# Patient Record
Sex: FEMALE | Race: WHITE | NOT HISPANIC OR LATINO | URBAN - NONMETROPOLITAN AREA
[De-identification: names, ages, dates, MRNs, and addresses within clinical notes are randomized per-mention and may not be internally consistent; named-entity substitution may affect disease eponyms.]

---

## 2018-02-06 ENCOUNTER — OFFICE VISIT (OUTPATIENT)
Dept: FAMILY MEDICINE CLINIC | Facility: CLINIC | Age: 53
End: 2018-02-06

## 2018-02-06 ENCOUNTER — LAB (OUTPATIENT)
Dept: LAB | Facility: OTHER | Age: 53
End: 2018-02-06

## 2018-02-06 VITALS
HEIGHT: 64 IN | SYSTOLIC BLOOD PRESSURE: 122 MMHG | DIASTOLIC BLOOD PRESSURE: 64 MMHG | HEART RATE: 84 BPM | BODY MASS INDEX: 24.62 KG/M2 | TEMPERATURE: 98.4 F | WEIGHT: 144.2 LBS

## 2018-02-06 DIAGNOSIS — B35.1 NAIL FUNGUS: ICD-10-CM

## 2018-02-06 DIAGNOSIS — I73.00 RAYNAUD'S DISEASE WITHOUT GANGRENE: Chronic | ICD-10-CM

## 2018-02-06 DIAGNOSIS — B35.1 NAIL FUNGUS: Primary | ICD-10-CM

## 2018-02-06 LAB
ALBUMIN SERPL-MCNC: 4.3 G/DL (ref 3.2–5.5)
ALBUMIN/GLOB SERPL: 1.7 G/DL (ref 1–3)
ALP SERPL-CCNC: 52 U/L (ref 15–121)
ALT SERPL W P-5'-P-CCNC: 15 U/L (ref 10–60)
ANION GAP SERPL CALCULATED.3IONS-SCNC: 8 MMOL/L (ref 5–15)
AST SERPL-CCNC: 19 U/L (ref 10–60)
BILIRUB SERPL-MCNC: 0.7 MG/DL (ref 0.2–1)
BUN BLD-MCNC: 10 MG/DL (ref 8–25)
BUN/CREAT SERPL: 16.7 (ref 7–25)
CALCIUM SPEC-SCNC: 9.9 MG/DL (ref 8.4–10.8)
CHLORIDE SERPL-SCNC: 101 MMOL/L (ref 100–112)
CO2 SERPL-SCNC: 29 MMOL/L (ref 20–32)
CREAT BLD-MCNC: 0.6 MG/DL (ref 0.4–1.3)
GFR SERPL CREATININE-BSD FRML MDRD: 104 ML/MIN/1.73 (ref 51–120)
GLOBULIN UR ELPH-MCNC: 2.6 GM/DL (ref 2.5–4.6)
GLUCOSE BLD-MCNC: 99 MG/DL (ref 70–100)
KOH PREP NAIL: NORMAL
POTASSIUM BLD-SCNC: 4.2 MMOL/L (ref 3.4–5.4)
PROT SERPL-MCNC: 6.9 G/DL (ref 6.7–8.2)
SODIUM BLD-SCNC: 138 MMOL/L (ref 134–146)

## 2018-02-06 PROCEDURE — 99213 OFFICE O/P EST LOW 20 MIN: CPT | Performed by: NURSE PRACTITIONER

## 2018-02-06 PROCEDURE — 80053 COMPREHEN METABOLIC PANEL: CPT | Performed by: NURSE PRACTITIONER

## 2018-02-06 PROCEDURE — 36415 COLL VENOUS BLD VENIPUNCTURE: CPT | Performed by: NURSE PRACTITIONER

## 2018-02-06 PROCEDURE — 87102 FUNGUS ISOLATION CULTURE: CPT | Performed by: NURSE PRACTITIONER

## 2018-02-06 PROCEDURE — 87220 TISSUE EXAM FOR FUNGI: CPT | Performed by: NURSE PRACTITIONER

## 2018-02-06 RX ORDER — TERBINAFINE HYDROCHLORIDE 250 MG/1
250 TABLET ORAL DAILY
Qty: 30 TABLET | Refills: 0 | Status: SHIPPED | OUTPATIENT
Start: 2018-02-06 | End: 2018-03-09

## 2018-02-06 NOTE — PROGRESS NOTES
Subjective   Anum Lawson is a 52 y.o. female. Patient here today with complaints of Upper Extremity Issue (finger)  Patient here today with complaints of left middle finger nail being discolored, thickened ×1 month, worsening.  Reports history of a knot's disease, was seeing Dr. Nelson and Dr. Flowers but no longer is seeing either one of them.  Wants to establish care with Dr. Reyes.  Reports numbness, pain to area but no drainage.  Denies fever, chills, nausea vomiting.    Vitals:    02/06/18 1319   BP: 122/64   Pulse: 84   Temp: 98.4 °F (36.9 °C)     No past medical history on file.  Upper Extremity Issue   This is a new problem. The current episode started 1 to 4 weeks ago. The problem occurs constantly. The problem has been gradually worsening. Associated symptoms include arthralgias, joint swelling (L hand, middle finger) and numbness (numbness reported to tip of L middle finger). Pertinent negatives include no abdominal pain, anorexia, chest pain, chills, congestion, coughing, diaphoresis, fatigue, fever, headaches, myalgias, nausea, neck pain, rash, sore throat, swollen glands, urinary symptoms, vertigo, visual change, vomiting or weakness. Nothing aggravates the symptoms. She has tried nothing for the symptoms. The treatment provided no relief.        The following portions of the patient's history were reviewed and updated as appropriate: allergies, current medications, past family history, past medical history, past social history, past surgical history and problem list.    Review of Systems   Constitutional: Negative.  Negative for chills, diaphoresis, fatigue and fever.   HENT: Negative.  Negative for congestion and sore throat.    Eyes: Negative.    Respiratory: Negative.  Negative for cough.    Cardiovascular: Negative.  Negative for chest pain.   Gastrointestinal: Negative.  Negative for abdominal pain, anorexia, nausea and vomiting.   Endocrine: Negative.    Genitourinary: Negative.     Musculoskeletal: Positive for arthralgias and joint swelling (L hand, middle finger). Negative for myalgias and neck pain.   Skin: Negative.  Negative for rash.   Allergic/Immunologic: Negative.    Neurological: Positive for numbness (numbness reported to tip of L middle finger). Negative for vertigo, weakness and headaches.   Hematological: Negative.    Psychiatric/Behavioral: Negative.        Objective   Physical Exam   Constitutional: She is oriented to person, place, and time. She appears well-developed and well-nourished. No distress.   Cardiovascular: Normal rate, regular rhythm and normal heart sounds.  Exam reveals no gallop and no friction rub.    No murmur heard.  Pulmonary/Chest: Effort normal and breath sounds normal. No respiratory distress. She has no wheezes. She has no rales.   Musculoskeletal: She exhibits tenderness.        Left hand: She exhibits decreased range of motion, tenderness, bony tenderness and swelling. She exhibits normal two-point discrimination, normal capillary refill, no deformity and no laceration. Normal sensation noted. Normal strength noted.        Hands:  Radial pulse palp    Neurological: She is alert and oriented to person, place, and time.   Skin: Skin is warm and dry. She is not diaphoretic.   Nursing note and vitals reviewed.      Assessment/Plan   Anum was seen today for upper extremity issue.    Diagnoses and all orders for this visit:    Nail fungus  -     Comprehensive Metabolic Panel; Future  -     Fungus Culture - Nail, Hand    Raynaud's disease without gangrene  -     Comprehensive Metabolic Panel; Future    Other orders  -     terbinafine (LAMISIL) 250 MG tablet; Take 1 tablet by mouth Daily.    will obtain CMP and can then start lamisil as above if LFTs WNL  She is aware not to start until she receives call from office informing her that labs are WNL  Will follow up with PCP or here in 1-2 weeks for recheck  Pt aware and in agreement to this plan  All  questions and concerns are addressed with understanding noted.   Continue with tylenol/motrin prn pain, swelling  Will clip nail and send to lab for testing as well

## 2018-02-12 ENCOUNTER — TELEPHONE (OUTPATIENT)
Dept: FAMILY MEDICINE CLINIC | Facility: CLINIC | Age: 53
End: 2018-02-12

## 2018-02-12 NOTE — TELEPHONE ENCOUNTER
Called and spoke with patients  and told him the results, and he stated her hands were still hurting and he wanted to make her an appointment with Shadia Carlos, so I connected him with the .

## 2018-02-13 ENCOUNTER — OFFICE VISIT (OUTPATIENT)
Dept: FAMILY MEDICINE CLINIC | Facility: CLINIC | Age: 53
End: 2018-02-13

## 2018-02-13 VITALS
DIASTOLIC BLOOD PRESSURE: 62 MMHG | HEART RATE: 84 BPM | HEIGHT: 64 IN | BODY MASS INDEX: 24.59 KG/M2 | WEIGHT: 144 LBS | SYSTOLIC BLOOD PRESSURE: 122 MMHG

## 2018-02-13 DIAGNOSIS — I73.00 RAYNAUD'S DISEASE WITHOUT GANGRENE: Chronic | ICD-10-CM

## 2018-02-13 DIAGNOSIS — L03.012 CELLULITIS OF FINGER OF LEFT HAND: Primary | ICD-10-CM

## 2018-02-13 PROCEDURE — 99213 OFFICE O/P EST LOW 20 MIN: CPT | Performed by: NURSE PRACTITIONER

## 2018-02-13 RX ORDER — GABAPENTIN 300 MG/1
300 CAPSULE ORAL
Qty: 30 CAPSULE | Refills: 0 | Status: SHIPPED | OUTPATIENT
Start: 2018-02-13 | End: 2018-03-09 | Stop reason: SDUPTHER

## 2018-02-13 RX ORDER — CEPHALEXIN 500 MG/1
500 CAPSULE ORAL 3 TIMES DAILY
Qty: 30 CAPSULE | Refills: 0 | Status: SHIPPED | OUTPATIENT
Start: 2018-02-13 | End: 2018-02-23

## 2018-02-13 NOTE — PROGRESS NOTES
Subjective   Anum Lawson is a 52 y.o. female. Patient here today with complaints of Nail Problem  pt here today with  complaining of L middle finger soreness, redness, she has hx of raynaud's dz. States she has used vaseline, neosporin which have helped pain somewhat. She did have nail sent to lab and was started on lamisil at last visit for fungus however lab was negative. She reports numbness , tingling pain to same area. She tried her 's gabapentin for the throbbing pain which is keeping her up at night and this helped. States she has been crying with pain however at present pain is mild.     Vitals:    02/13/18 1050   BP: 122/62   Pulse: 84     Past Medical History:   Diagnosis Date   • History of mammogram 12/05/2014   • Joint pain    • Painful swelling of joint    • Raynaud's disease      Wound Infection   This is a new problem. The current episode started 1 to 4 weeks ago. The problem occurs constantly. The problem has been unchanged. Associated symptoms include joint swelling and numbness. Pertinent negatives include no abdominal pain, anorexia, arthralgias, change in bowel habit, chest pain, chills, congestion, coughing, diaphoresis, fatigue, fever, headaches, myalgias, nausea, neck pain, rash, sore throat, swollen glands, urinary symptoms, vertigo, visual change, vomiting or weakness. The symptoms are aggravated by bending. Treatments tried: gabapentin. The treatment provided mild relief.        The following portions of the patient's history were reviewed and updated as appropriate: allergies, current medications, past family history, past medical history, past social history, past surgical history and problem list.    Review of Systems   Constitutional: Negative.  Negative for chills, diaphoresis, fatigue and fever.   HENT: Negative.  Negative for congestion and sore throat.    Eyes: Negative.    Respiratory: Negative.  Negative for cough.    Cardiovascular: Negative.  Negative for  chest pain.   Gastrointestinal: Negative.  Negative for abdominal pain, anorexia, change in bowel habit, nausea and vomiting.   Endocrine: Negative.    Genitourinary: Negative.    Musculoskeletal: Positive for joint swelling. Negative for arthralgias, myalgias and neck pain.   Skin: Positive for wound. Negative for rash.   Allergic/Immunologic: Negative.    Neurological: Positive for numbness. Negative for vertigo, weakness and headaches.   Hematological: Negative.    Psychiatric/Behavioral: Negative.        Objective   Physical Exam   Constitutional: She is oriented to person, place, and time. She appears well-developed and well-nourished. No distress.   HENT:   Head: Normocephalic and atraumatic.   Cardiovascular: Normal rate, regular rhythm and normal heart sounds.  Exam reveals no gallop and no friction rub.    No murmur heard.  Pulmonary/Chest: Effort normal and breath sounds normal. No respiratory distress. She has no wheezes. She has no rales.   Neurological: She is alert and oriented to person, place, and time.   Skin: Skin is warm and dry. No abrasion, no bruising, no burn, no ecchymosis, no laceration, no purpura and no rash noted. Rash is not macular, not papular, not maculopapular, not nodular, not pustular, not vesicular and not urticarial. She is not diaphoretic. There is erythema. No pallor.        The distal L hand, middle finger is erythematous, tender to palp, sl cool to touch, radial pulse palp, she has full ROM but complains of pain with this, nail is  from nail bed distally as well   Psychiatric: She has a normal mood and affect. Her behavior is normal.   Nursing note and vitals reviewed.      Assessment/Plan   Anum was seen today for nail problem.    Diagnoses and all orders for this visit:    Cellulitis of finger of left hand    Raynaud's disease without gangrene    Other orders  -     cephalexin (KEFLEX) 500 MG capsule; Take 1 capsule by mouth 3 (Three) Times a Day for 10  days.  -     gabapentin (NEURONTIN) 300 MG capsule; Take 1 capsule by mouth every night at bedtime for 30 days.      Labs are reviewed at today's visit.   shelby is obtained and reviewed  SHELBY query complete. Treatment plan to include limited course of prescribed controlled substance. Risks including addiction, benefits, and alternatives presented to patient.   Is given keflex and gabapentin for pain as above  She will RTC in 2 weeks, sooner if nec  She has appointment to establish with PCP next month  They are aware and are in agreement to this plan.  All questions and concerns are addressed with understanding noted.   Will stop lamisil

## 2018-02-15 LAB
FUNGUS WND CULT: ABNORMAL
FUNGUS WND CULT: ABNORMAL

## 2018-02-15 RX ORDER — FLUCONAZOLE 200 MG/1
200 TABLET ORAL DAILY
Qty: 24 TABLET | Refills: 0 | Status: SHIPPED | OUTPATIENT
Start: 2018-02-15 | End: 2018-03-09

## 2018-02-16 ENCOUNTER — TELEPHONE (OUTPATIENT)
Dept: FAMILY MEDICINE CLINIC | Facility: CLINIC | Age: 53
End: 2018-02-16

## 2018-02-16 NOTE — TELEPHONE ENCOUNTER
Informed pt of results and that we sent her medication into [facility], and If she has ay questions she can give our office a call.

## 2018-02-16 NOTE — TELEPHONE ENCOUNTER
----- Message from ANDREW Aguiar sent at 2/15/2018  8:40 AM CST -----  Inform pt, give her diflucan 200mg every week x 6 months

## 2018-02-27 ENCOUNTER — OFFICE VISIT (OUTPATIENT)
Dept: FAMILY MEDICINE CLINIC | Facility: CLINIC | Age: 53
End: 2018-02-27

## 2018-02-27 VITALS
HEIGHT: 64 IN | HEART RATE: 87 BPM | TEMPERATURE: 99 F | WEIGHT: 143 LBS | DIASTOLIC BLOOD PRESSURE: 68 MMHG | SYSTOLIC BLOOD PRESSURE: 120 MMHG | BODY MASS INDEX: 24.41 KG/M2

## 2018-02-27 DIAGNOSIS — L03.012 INFECTED NAILBED OF FINGER, LEFT: Primary | ICD-10-CM

## 2018-02-27 DIAGNOSIS — B37.9 CANDIDA ALBICANS INFECTION: ICD-10-CM

## 2018-02-27 DIAGNOSIS — I73.00 RAYNAUD'S DISEASE WITHOUT GANGRENE: ICD-10-CM

## 2018-02-27 PROCEDURE — 99213 OFFICE O/P EST LOW 20 MIN: CPT | Performed by: NURSE PRACTITIONER

## 2018-02-27 NOTE — PROGRESS NOTES
Subjective   Anum Lawson is a 52 y.o. female. Patient here today with complaints of Follow-up  pt here today with  for recheck of nail / finger infection. Still taking antibiotic since she has missed some doses, cont on diflucan weekly . Lab/nail cx indicated yeast infection. Cont on neurontin which has helped her symptoms of numbness, L hand, middle finger. Reports pain, swelling, erythema of L hand,middle finger much improved from last visit. She is establishing care with Dr. Reyes next week    Vitals:    02/27/18 1104   BP: 120/68   Pulse: 87   Temp: 99 °F (37.2 °C)     Past Medical History:   Diagnosis Date   • History of mammogram 12/05/2014   • Joint pain    • Painful swelling of joint    • Raynaud's disease      Wound Infection   This is a new problem. The current episode started 1 to 4 weeks ago. The problem occurs constantly. The problem has been rapidly improving. Pertinent negatives include no rash. Nothing aggravates the symptoms. Treatments tried: dilfucan, antibiotics. The treatment provided moderate relief.        The following portions of the patient's history were reviewed and updated as appropriate: allergies, current medications, past family history, past medical history, past social history, past surgical history and problem list.    Review of Systems   Constitutional: Negative.    HENT: Negative.    Eyes: Negative.    Respiratory: Negative.    Cardiovascular: Negative.    Gastrointestinal: Negative.    Endocrine: Negative.    Genitourinary: Negative.    Musculoskeletal: Negative.    Skin: Positive for wound (improved). Negative for rash.   Allergic/Immunologic: Negative.    Neurological: Negative.    Hematological: Negative.    Psychiatric/Behavioral: Negative.        Objective   Physical Exam   Constitutional: She is oriented to person, place, and time. She appears well-developed and well-nourished. No distress.   HENT:   Head: Normocephalic and atraumatic.   Cardiovascular:  Normal rate, regular rhythm and normal heart sounds.  Exam reveals no gallop and no friction rub.    No murmur heard.  Pulmonary/Chest: Effort normal and breath sounds normal. No respiratory distress. She has no wheezes. She has no rales.   Neurological: She is alert and oriented to person, place, and time.   Skin: Skin is warm and dry. No rash noted. She is not diaphoretic. No erythema. No pallor.   L hand, middle finger with erythema, pain and swelling greatly improved from last visit here. Tip of finger no longer cool to touch, nail is not pulling away from the bed of nail as it was previously, skin on tip of this finger is scaling, dried   Psychiatric: She has a normal mood and affect. Her behavior is normal.   Nursing note and vitals reviewed.      Assessment/Plan   Anum was seen today for follow-up.    Diagnoses and all orders for this visit:    Infected nailbed of finger, left    Candida albicans infection  Comments:  L hand, middle fingernail    Raynaud's disease without gangrene    follow up with dr. weinstein next week as scheduled  Cont on diflucan weekly, finish antibiotics, keflex  Cont on neurontin as was prescribed  No refills needed today  She is aware and is in agreement to this plan  All questions and concerns are addressed with understanding noted.

## 2018-03-09 ENCOUNTER — TELEPHONE (OUTPATIENT)
Dept: FAMILY MEDICINE CLINIC | Facility: CLINIC | Age: 53
End: 2018-03-09

## 2018-03-09 ENCOUNTER — LAB (OUTPATIENT)
Dept: LAB | Facility: OTHER | Age: 53
End: 2018-03-09

## 2018-03-09 ENCOUNTER — OFFICE VISIT (OUTPATIENT)
Dept: FAMILY MEDICINE CLINIC | Facility: CLINIC | Age: 53
End: 2018-03-09

## 2018-03-09 VITALS
OXYGEN SATURATION: 98 % | SYSTOLIC BLOOD PRESSURE: 118 MMHG | HEIGHT: 64 IN | BODY MASS INDEX: 24.75 KG/M2 | TEMPERATURE: 97.8 F | DIASTOLIC BLOOD PRESSURE: 72 MMHG | HEART RATE: 74 BPM | WEIGHT: 145 LBS

## 2018-03-09 DIAGNOSIS — Z11.59 NEED FOR HEPATITIS C SCREENING TEST: ICD-10-CM

## 2018-03-09 DIAGNOSIS — Z12.31 ENCOUNTER FOR SCREENING MAMMOGRAM FOR BREAST CANCER: ICD-10-CM

## 2018-03-09 DIAGNOSIS — Z76.89 ENCOUNTER TO ESTABLISH CARE WITH NEW DOCTOR: ICD-10-CM

## 2018-03-09 DIAGNOSIS — I73.00 RAYNAUD'S DISEASE WITHOUT GANGRENE: ICD-10-CM

## 2018-03-09 DIAGNOSIS — B35.1 FUNGAL NAIL INFECTION: Primary | ICD-10-CM

## 2018-03-09 LAB
HAV IGM SERPL QL IA: NEGATIVE
HBV CORE IGM SERPL QL IA: NEGATIVE
HBV SURFACE AG SERPL QL IA: NEGATIVE
HCV AB SER DONR QL: NEGATIVE

## 2018-03-09 PROCEDURE — 80074 ACUTE HEPATITIS PANEL: CPT | Performed by: FAMILY MEDICINE

## 2018-03-09 PROCEDURE — 99214 OFFICE O/P EST MOD 30 MIN: CPT | Performed by: FAMILY MEDICINE

## 2018-03-09 RX ORDER — FLUCONAZOLE 200 MG/1
200 TABLET ORAL WEEKLY
Qty: 24 TABLET | Refills: 0
Start: 2018-03-09 | End: 2019-03-05

## 2018-03-09 RX ORDER — GABAPENTIN 100 MG/1
CAPSULE ORAL
Qty: 60 CAPSULE | Refills: 5 | Status: SHIPPED | OUTPATIENT
Start: 2018-03-09 | End: 2019-03-05

## 2018-03-09 RX ORDER — TERBINAFINE HYDROCHLORIDE 250 MG/1
250 TABLET ORAL DAILY
Qty: 30 TABLET | Refills: 0 | Status: SHIPPED | OUTPATIENT
Start: 2018-03-09 | End: 2019-03-05

## 2018-03-09 NOTE — PROGRESS NOTES
"Subjective   Chief Complaint   Patient presents with   • Establish Care   • Hand Pain     finger on left hand     Anum Lawson is a 52 y.o. female.   Establish Care and Hand Pain (finger on left hand)    History of Present Illness     Presents today to establish care    Presents today for a follow up on a fungal infection of the L 3rd digit  Has been treated with neurontin, diflucan, and lamsil  Had finished the course of lamisil with improvement  Complaining of tingling, numbness and burning - managed with neurontin  Started about 3 weeks prior to her first appointment with Shadia Carlos in February  Works as a  at [employer]    The following portions of the patient's history were reviewed and updated as appropriate: allergies, current medications, past family history, past medical history, past social history, past surgical history and problem list.    Review of Systems   Constitutional: Negative for appetite change, chills, fatigue and fever.   HENT: Negative for congestion, ear pain, rhinorrhea and sore throat.    Eyes: Negative for pain.   Respiratory: Negative for cough and shortness of breath.    Cardiovascular: Negative for chest pain and palpitations.   Gastrointestinal: Negative for abdominal pain, constipation, diarrhea and nausea.   Genitourinary: Negative for dysuria.   Musculoskeletal: Negative for back pain, joint swelling and neck pain.   Skin: Negative for rash.        Nail infection  Swollen digits   Neurological: Negative for dizziness and headaches.       Objective   /72  Pulse 74  Temp 97.8 °F (36.6 °C)  Ht 162.6 cm (64.02\")  Wt 65.8 kg (145 lb)  SpO2 98%  BMI 24.88 kg/m2  Physical Exam   Constitutional: She is oriented to person, place, and time. She appears well-developed and well-nourished.   HENT:   Head: Normocephalic and atraumatic.   Eyes: Pupils are equal, round, and reactive to light.   Neck: Normal range of motion. Neck supple.   Cardiovascular: Normal rate, regular " rhythm and normal heart sounds.    Pulmonary/Chest: Effort normal and breath sounds normal. No respiratory distress. She has no wheezes. She has no rales.   Abdominal: Soft. Bowel sounds are normal.   Musculoskeletal: Normal range of motion.   Neurological: She is alert and oriented to person, place, and time.   Skin: Skin is warm and dry.   Left 3rd digit tip is dry and cracked  All nail beds appear dry   Digits are swollen   Psychiatric: She has a normal mood and affect.   Nursing note and vitals reviewed.      Assessment/Plan   Problems Addressed this Visit        Cardiovascular and Mediastinum    Raynaud's disease      Other Visit Diagnoses     Fungal nail infection    -  Primary    Relevant Medications    fluconazole (DIFLUCAN) 200 MG tablet    terbinafine (LAMISIL) 250 MG tablet    Need for hepatitis C screening test        Relevant Orders    Hepatitis Panel, Acute    Encounter for screening mammogram for breast cancer        Relevant Orders    Mammo Screening Digital Tomosynthesis Bilateral With CAD    Encounter to establish care with new doctor            colonoscopy Dec 2014 - [place] - called for medical records    Adjusted neurontin    Labs ordered    Pelvic and mammogram ordered and scheduled    Recheck in 4 weeks

## 2018-03-09 NOTE — TELEPHONE ENCOUNTER
----- Message from Marylu Reyes MD sent at 3/9/2018 12:58 PM CST -----  Please call and let her know I restarted her lamisil x 30 days  Take diflucan as directed  Continue with neurontin  Use a cuticle oil nightly on all nails - rub it into the nail bed

## 2018-03-09 NOTE — TELEPHONE ENCOUNTER
Please call and let her know I restarted her lamisil x 30 days   Take diflucan as directed   Continue with neurontin   Use a cuticle oil nightly on all nails - rub it into the nail bed      Left voicemail for patient and stated above message. Also told her to return our phone call if she had any questions

## 2018-03-09 NOTE — TELEPHONE ENCOUNTER
----- Message from Marylu Reyes MD sent at 3/9/2018  2:51 PM CST -----  Hepatitis panel is negative.

## 2018-04-09 ENCOUNTER — PROCEDURE VISIT (OUTPATIENT)
Dept: FAMILY MEDICINE CLINIC | Facility: CLINIC | Age: 53
End: 2018-04-09

## 2018-04-09 VITALS
BODY MASS INDEX: 24.41 KG/M2 | HEIGHT: 64 IN | HEART RATE: 81 BPM | TEMPERATURE: 98 F | WEIGHT: 143 LBS | SYSTOLIC BLOOD PRESSURE: 104 MMHG | DIASTOLIC BLOOD PRESSURE: 60 MMHG | OXYGEN SATURATION: 99 %

## 2018-04-09 DIAGNOSIS — Z01.419 WELL WOMAN EXAM WITH ROUTINE GYNECOLOGICAL EXAM: Primary | ICD-10-CM

## 2018-04-09 DIAGNOSIS — Z12.4 ENCOUNTER FOR PAPANICOLAOU SMEAR FOR CERVICAL CANCER SCREENING: ICD-10-CM

## 2018-04-09 PROCEDURE — 99396 PREV VISIT EST AGE 40-64: CPT | Performed by: FAMILY MEDICINE

## 2018-04-09 NOTE — PROGRESS NOTES
Subjective   Chief Complaint   Patient presents with   • Gynecologic Exam     pap smear     Anum Lawson is a 52 y.o. year old  presenting to be seen for her annual exam.      She is sexually active.  In the past 12 months there has not been new sexual partners.  Condoms are not typically used.  She would not like to be screened for STD's at today's exam.     She exercises regularly: yes.  She wears her seat belt:yes.  She has concerns about domestic violence: no.  She is taking Vit D and Calcium:no  Last colonoscopy: , repeat in 5 years  Last DEXA: none  Last MMG: scheduled  Last PAP:    Hx of abnormal pap: none    Yes  NATURAL MENOPAUSE  LMP: unknown    OB History      Para Term  AB Living    4 2 2  2 2    SAB TAB Ectopic Molar Multiple Live Births    2               No Additional Complaints Reported    The following portions of the patient's history were reviewed and updated as appropriate:problem list, current medications, allergies, past family history, past medical history, past social history and past surgical history.    Smoking status: Former Smoker                                                              Packs/day: 0.00      Years: 0.00      Smokeless tobacco: Never Used                      Comment: household exposure    Review of Systems   Constitutional: Negative for appetite change, chills, fatigue and fever.   HENT: Negative for congestion, ear pain, rhinorrhea and sore throat.    Eyes: Negative for pain.   Respiratory: Negative for cough and shortness of breath.    Cardiovascular: Negative for chest pain and palpitations.   Gastrointestinal: Negative for abdominal pain, constipation, diarrhea and nausea.   Genitourinary: Negative for dysuria.   Musculoskeletal: Negative for back pain, joint swelling and neck pain.   Skin: Negative for rash.   Neurological: Negative for dizziness and headaches.         Objective   /60   Pulse 81   Temp 98 °F (36.7 °C)    "Ht 162.6 cm (64.02\")   Wt 64.9 kg (143 lb)   SpO2 99%   BMI 24.53 kg/m²     General:  well developed; well nourished  no acute distress   Skin:  No suspicious lesions seen   Cardiac: Heart sounds are normal.  Regular rate and rhythm without murmur, gallop or rub.  Heart regular rate and rhythm   Resp:  Normal expansion.  Clear to auscultation.  No rales, rhonchi, or wheezing.   Thyroid: normal to inspection and palpation   Breasts:  Examined in supine position  Symmetric without masses or skin dimpling  Nipples normal without inversion, lesions or discharge  There are no palpable axillary nodes   Abdomen: soft, non-tender; no masses  no umbilical or inginual hernias are present  no hepato-splenomegaly   Psych: alert,oriented, in NAD with a full range of affect, normal behavior and no psychotic features   Pelvis: Clinical staff was present for exam  External genitalia:  normal appearance of the external genitalia including Bartholin's and [place]'s glands.  :  urethral meatus normal;  Vaginal:  normal pink mucosa without prolapse or lesions.  Cervix:  normal appearance.  Uterus:  normal size, shape and consistency.  Adnexa:  normal bimanual exam of the adnexa.  Rectal:  digital rectal exam not performed; anus visually normal appearing.  Pelvic floor pain: pelvic floor is nontender to palpation in 4 quadrants.     Lab Review   No data reviewed    Imaging  mammogram done today       Diagnoses and all orders for this visit:    Well woman exam with routine gynecological exam    Encounter for Papanicolaou smear for cervical cancer screening  -     Liquid-based Pap Smear, Screening; Future      Recommended tetanus booster - patient states this was done at [facility] pharmacy    Discussed the patient's BMI with her. BMI is within normal parameters. No follow-up required.    This note was electronically signed.    Marylu Reyes MD  April 9, 2018    "

## 2018-04-11 ENCOUNTER — TELEPHONE (OUTPATIENT)
Dept: FAMILY MEDICINE CLINIC | Facility: CLINIC | Age: 53
End: 2018-04-11

## 2018-04-11 LAB
LAB AP CASE REPORT: NORMAL
LAB AP GYN ADDITIONAL INFORMATION: NORMAL
LAB AP GYN OTHER FINDINGS: NORMAL
Lab: NORMAL
PATH INTERP SPEC-IMP: NORMAL
STAT OF ADQ CVX/VAG CYTO-IMP: NORMAL

## 2018-04-11 NOTE — TELEPHONE ENCOUNTER
----- Message from Marylu Reyes MD sent at 4/10/2018 10:54 AM CDT -----  Mammogram is normal. Repeat next year.

## 2018-04-12 ENCOUNTER — TELEPHONE (OUTPATIENT)
Dept: FAMILY MEDICINE CLINIC | Facility: CLINIC | Age: 53
End: 2018-04-12

## 2019-02-08 ENCOUNTER — OFFICE VISIT (OUTPATIENT)
Dept: FAMILY MEDICINE CLINIC | Facility: CLINIC | Age: 54
End: 2019-02-08

## 2019-02-08 VITALS
DIASTOLIC BLOOD PRESSURE: 72 MMHG | OXYGEN SATURATION: 99 % | SYSTOLIC BLOOD PRESSURE: 120 MMHG | BODY MASS INDEX: 21.51 KG/M2 | WEIGHT: 126 LBS | HEIGHT: 64 IN | TEMPERATURE: 97.9 F | RESPIRATION RATE: 16 BRPM | HEART RATE: 70 BPM

## 2019-02-08 DIAGNOSIS — R05.9 COUGH: ICD-10-CM

## 2019-02-08 DIAGNOSIS — J06.9 ACUTE URI: Primary | ICD-10-CM

## 2019-02-08 PROCEDURE — 96372 THER/PROPH/DIAG INJ SC/IM: CPT | Performed by: NURSE PRACTITIONER

## 2019-02-08 PROCEDURE — 99213 OFFICE O/P EST LOW 20 MIN: CPT | Performed by: NURSE PRACTITIONER

## 2019-02-08 RX ORDER — DEXTROMETHORPHAN HYDROBROMIDE AND PROMETHAZINE HYDROCHLORIDE 15; 6.25 MG/5ML; MG/5ML
5 SYRUP ORAL 4 TIMES DAILY PRN
Qty: 180 ML | Refills: 0 | Status: SHIPPED | OUTPATIENT
Start: 2019-02-08 | End: 2019-03-05

## 2019-02-08 RX ORDER — CEFTRIAXONE 1 G/1
1 INJECTION, POWDER, FOR SOLUTION INTRAMUSCULAR; INTRAVENOUS ONCE
Status: COMPLETED | OUTPATIENT
Start: 2019-02-08 | End: 2019-02-08

## 2019-02-08 RX ORDER — CEFDINIR 300 MG/1
300 CAPSULE ORAL 2 TIMES DAILY
Qty: 20 CAPSULE | Refills: 0 | Status: SHIPPED | OUTPATIENT
Start: 2019-02-08 | End: 2019-02-18

## 2019-02-08 RX ORDER — PREDNISONE 20 MG/1
TABLET ORAL
Qty: 17 TABLET | Refills: 0 | Status: SHIPPED | OUTPATIENT
Start: 2019-02-08 | End: 2019-03-05

## 2019-02-08 RX ADMIN — CEFTRIAXONE 1 G: 1 INJECTION, POWDER, FOR SOLUTION INTRAMUSCULAR; INTRAVENOUS at 12:31

## 2019-02-08 NOTE — PROGRESS NOTES
Chief Complaint   Patient presents with   • Cough     chest tightness     Subjective   Anum Lawson is a 53 y.o. female who presents to the office for XXXXX.    The following portions of the patient's history were reviewed and updated as appropriate: allergies, current medications, past family history, past medical history, past social history, past surgical history and problem list.    Cough   This is a new problem. The current episode started in the past 7 days. The problem has been rapidly worsening. The problem occurs every few minutes. The cough is productive of purulent sputum. Associated symptoms include a fever (initially but stopped after 2-3 days), nasal congestion and shortness of breath. Pertinent negatives include no chest pain, chills, ear congestion, ear pain, headaches, postnasal drip, rash, rhinorrhea, sore throat, sweats or wheezing. Nothing aggravates the symptoms. She has tried OTC cough suppressant for the symptoms. The treatment provided no relief.        Past Medical History:   Diagnosis Date   • History of mammogram 12/05/2014   • Joint pain    • Painful swelling of joint    • Raynaud's disease           Family History   Problem Relation Age of Onset   • Cancer Other         grandmother   • Heart disease Other         grandparents   • Alzheimer's disease Other    • Colon cancer Other    • Lung cancer Other    • Thyroid disease Other    • Schizophrenia Mother    • Schizophrenia Brother    • Colon cancer Paternal Grandmother         Review of Systems   Constitutional: Positive for fever (initially but stopped after 2-3 days). Negative for chills and unexpected weight change.   HENT: Negative.  Negative for ear pain, postnasal drip, rhinorrhea and sore throat.    Eyes: Negative.    Respiratory: Positive for cough and shortness of breath. Negative for chest tightness and wheezing.    Cardiovascular: Negative.  Negative for chest pain.   Gastrointestinal: Negative.    Endocrine: Negative.   "  Genitourinary: Negative.  Negative for dysuria.   Musculoskeletal: Negative.    Skin: Negative.  Negative for color change, pallor, rash and wound.   Allergic/Immunologic: Negative.    Neurological: Negative.  Negative for headaches.   Hematological: Negative.    Psychiatric/Behavioral: Negative.  Negative for sleep disturbance and suicidal ideas.       Objective   Vitals:    02/08/19 1036   BP: 120/72   BP Location: Left arm   Patient Position: Sitting   Cuff Size: Adult   Pulse: 70   Resp: 16   Temp: 97.9 °F (36.6 °C)   TempSrc: Oral   SpO2: 99%   Weight: 57.2 kg (126 lb)   Height: 162.6 cm (64\")   PainSc: 0-No pain     Physical Exam   Constitutional: She is oriented to person, place, and time. She appears well-developed and well-nourished. No distress.   HENT:   Head: Normocephalic and atraumatic.   Right Ear: External ear normal.   Left Ear: External ear normal.   Nose: Nose normal.   Mouth/Throat: Oropharynx is clear and moist. No oropharyngeal exudate.   Eyes: Conjunctivae are normal. Pupils are equal, round, and reactive to light. Right eye exhibits no discharge. Left eye exhibits no discharge.   Neck: Normal range of motion. Neck supple. No tracheal deviation present. No thyromegaly present.   Cardiovascular: Normal rate, regular rhythm, normal heart sounds and intact distal pulses. Exam reveals no gallop and no friction rub.   No murmur heard.  Pulmonary/Chest: Effort normal. No accessory muscle usage or stridor. No tachypnea. No respiratory distress. She has no decreased breath sounds. She has no wheezes. She has rhonchi in the right upper field and the left upper field. She has no rales. She exhibits no tenderness.   Abdominal: Soft. Bowel sounds are normal.   Musculoskeletal: Normal range of motion. She exhibits no edema, tenderness or deformity.   Lymphadenopathy:     She has no cervical adenopathy.   Neurological: She is alert and oriented to person, place, and time. No cranial nerve deficit.   Skin: " Skin is warm and dry. Capillary refill takes 2 to 3 seconds. She is not diaphoretic. No erythema. No pallor.   Psychiatric: She has a normal mood and affect. Her behavior is normal. Judgment and thought content normal.   Nursing note and vitals reviewed.      Assessment/Plan   Anum was seen today for cough.    Diagnoses and all orders for this visit:    Acute URI  -     cefdinir (OMNICEF) 300 MG capsule; Take 1 capsule by mouth 2 (Two) Times a Day for 10 days.  -     predniSONE (DELTASONE) 20 MG tablet; Take 3 tablets with first am meal daily on days 1,2, 3, then 2 tablets daily on days 4,5,6 then 1 tablet daily on days 7 and 8.  -     promethazine-dextromethorphan (PROMETHAZINE-DM) 6.25-15 MG/5ML syrup; Take 5 mL by mouth 4 (Four) Times a Day As Needed for Cough.  -     cefTRIAXone (ROCEPHIN) injection 1 g; Inject 1 g into the appropriate muscle as directed by prescriber 1 (One) Time.    Cough  -     cefdinir (OMNICEF) 300 MG capsule; Take 1 capsule by mouth 2 (Two) Times a Day for 10 days.  -     predniSONE (DELTASONE) 20 MG tablet; Take 3 tablets with first am meal daily on days 1,2, 3, then 2 tablets daily on days 4,5,6 then 1 tablet daily on days 7 and 8.  -     promethazine-dextromethorphan (PROMETHAZINE-DM) 6.25-15 MG/5ML syrup; Take 5 mL by mouth 4 (Four) Times a Day As Needed for Cough.  -     cefTRIAXone (ROCEPHIN) injection 1 g; Inject 1 g into the appropriate muscle as directed by prescriber 1 (One) Time.           PHQ-2/PHQ-9 Depression Screening 2/8/2019   Little interest or pleasure in doing things 0   Feeling down, depressed, or hopeless 0   Total Score 0       Crystal L Lomeli, APRN         Return if symptoms worsen or fail to improve, for Recheck.    There are no Patient Instructions on file for this visit.

## 2019-03-05 ENCOUNTER — OFFICE VISIT (OUTPATIENT)
Dept: FAMILY MEDICINE CLINIC | Facility: CLINIC | Age: 54
End: 2019-03-05

## 2019-03-05 VITALS
WEIGHT: 129.8 LBS | BODY MASS INDEX: 22.16 KG/M2 | SYSTOLIC BLOOD PRESSURE: 110 MMHG | TEMPERATURE: 98.2 F | HEART RATE: 70 BPM | DIASTOLIC BLOOD PRESSURE: 68 MMHG | HEIGHT: 64 IN | OXYGEN SATURATION: 100 %

## 2019-03-05 DIAGNOSIS — B37.2 CANDIDA ONYCHOMYCOSIS: Primary | ICD-10-CM

## 2019-03-05 PROCEDURE — 99213 OFFICE O/P EST LOW 20 MIN: CPT | Performed by: NURSE PRACTITIONER

## 2019-03-05 RX ORDER — CEPHALEXIN 500 MG/1
500 CAPSULE ORAL 2 TIMES DAILY
Qty: 20 CAPSULE | Refills: 0 | Status: SHIPPED | OUTPATIENT
Start: 2019-03-05 | End: 2019-10-02

## 2019-03-05 RX ORDER — FLUCONAZOLE 200 MG/1
200 TABLET ORAL WEEKLY
Qty: 8 TABLET | Refills: 0 | Status: SHIPPED | OUTPATIENT
Start: 2019-03-05 | End: 2019-10-02

## 2019-03-07 NOTE — PROGRESS NOTES
Subjective   Anum Lawson is a 53 y.o. female. Patient here today with complaints of Nail Problem (left hand ring finger)  Patient here today with complaints of left hand, ring finger, nail thick and yellow, white, distally reddened and she reports history of candidal fungus on different nail in the past, treatment of Diflucan and antibiotics helped her previously, symptoms resolved at that point    Vitals:    03/05/19 1002   BP: 110/68   Pulse: 70   Temp: 98.2 °F (36.8 °C)   SpO2: 100%     Past Medical History:   Diagnosis Date   • History of mammogram 12/05/2014   • Joint pain    • Painful swelling of joint    • Raynaud's disease      History of Present Illness     The following portions of the patient's history were reviewed and updated as appropriate: allergies, current medications, past family history, past medical history, past social history, past surgical history and problem list.    Review of Systems   Constitutional: Negative.    HENT: Negative.    Eyes: Negative.    Respiratory: Negative.    Cardiovascular: Negative.    Gastrointestinal: Negative.    Endocrine: Negative.    Genitourinary: Negative.    Musculoskeletal: Negative.    Skin: Positive for wound.   Allergic/Immunologic: Negative.    Neurological: Negative.    Hematological: Negative.    Psychiatric/Behavioral: Negative.        Objective   Physical Exam   Constitutional: She is oriented to person, place, and time. She appears well-developed and well-nourished. No distress.   HENT:   Head: Normocephalic and atraumatic.   Cardiovascular: Normal rate, regular rhythm and normal heart sounds. Exam reveals no gallop and no friction rub.   No murmur heard.  Pulmonary/Chest: Effort normal and breath sounds normal. No stridor. No respiratory distress. She has no wheezes. She has no rales.   Neurological: She is alert and oriented to person, place, and time.   Skin: Skin is warm and dry. No rash noted. She is not diaphoretic. No erythema. No pallor.    L hand , ring finger, with nail pulling away from nailbed, tip erythematous, nail is thick, yellowed, states had same complaints, different finger, previously    Psychiatric: She has a normal mood and affect. Her behavior is normal.   Nursing note and vitals reviewed.      Assessment/Plan   Anum was seen today for nail problem.    Diagnoses and all orders for this visit:    Candida onychomycosis    Other orders  -     fluconazole (DIFLUCAN) 200 MG tablet; Take 1 tablet by mouth 1 (One) Time Per Week.  -     ciclopirox (PENLAC) 8 % solution; Apply  topically to the appropriate area as directed Every Night.  -     cephalexin (KEFLEX) 500 MG capsule; Take 1 capsule by mouth 2 (Two) Times a Day.    I will treat her again with Keflex, Penlac and Diflucan as above, this has helped resolve similar complaints in the past, advised she follow-up either here or with PCP in 2 months for recheck, sooner if needed.  She is aware and is in agreement to this plan.  All questions and concerns are addressed with understanding noted.

## 2019-03-19 ENCOUNTER — OFFICE VISIT (OUTPATIENT)
Dept: FAMILY MEDICINE CLINIC | Facility: CLINIC | Age: 54
End: 2019-03-19

## 2019-03-19 ENCOUNTER — LAB (OUTPATIENT)
Dept: LAB | Facility: OTHER | Age: 54
End: 2019-03-19

## 2019-03-19 VITALS
TEMPERATURE: 98 F | SYSTOLIC BLOOD PRESSURE: 116 MMHG | BODY MASS INDEX: 21.83 KG/M2 | HEART RATE: 90 BPM | DIASTOLIC BLOOD PRESSURE: 70 MMHG | WEIGHT: 127.2 LBS

## 2019-03-19 DIAGNOSIS — L03.012 CELLULITIS OF FINGER OF LEFT HAND: Primary | ICD-10-CM

## 2019-03-19 DIAGNOSIS — B35.1 NAIL FUNGUS: ICD-10-CM

## 2019-03-19 DIAGNOSIS — I73.00 RAYNAUD'S DISEASE WITHOUT GANGRENE: ICD-10-CM

## 2019-03-19 DIAGNOSIS — L03.012 CELLULITIS OF FINGER OF LEFT HAND: ICD-10-CM

## 2019-03-19 LAB
ALBUMIN SERPL-MCNC: 4.8 G/DL (ref 3.5–5)
ALBUMIN/GLOB SERPL: 1.6 G/DL (ref 1.1–1.8)
ALP SERPL-CCNC: 63 U/L (ref 38–126)
ALT SERPL W P-5'-P-CCNC: 20 U/L
ANION GAP SERPL CALCULATED.3IONS-SCNC: 8 MMOL/L (ref 5–15)
AST SERPL-CCNC: 19 U/L (ref 14–36)
BASOPHILS # BLD AUTO: 0.04 10*3/MM3 (ref 0–0.2)
BASOPHILS NFR BLD AUTO: 0.5 % (ref 0–2)
BILIRUB SERPL-MCNC: 0.7 MG/DL (ref 0.2–1.3)
BUN BLD-MCNC: 10 MG/DL (ref 7–17)
BUN/CREAT SERPL: 13 (ref 7–25)
CALCIUM SPEC-SCNC: 9.9 MG/DL (ref 8.4–10.2)
CHLORIDE SERPL-SCNC: 99 MMOL/L (ref 98–107)
CO2 SERPL-SCNC: 33 MMOL/L (ref 22–30)
CREAT BLD-MCNC: 0.77 MG/DL (ref 0.52–1.04)
CRP SERPL-MCNC: 0.9 MG/DL (ref 0–1)
DEPRECATED RDW RBC AUTO: 40 FL (ref 36.4–46.3)
EOSINOPHIL # BLD AUTO: 0.12 10*3/MM3 (ref 0–0.7)
EOSINOPHIL NFR BLD AUTO: 1.6 % (ref 0–7)
ERYTHROCYTE [DISTWIDTH] IN BLOOD BY AUTOMATED COUNT: 12.9 % (ref 11.5–14.5)
GFR SERPL CREATININE-BSD FRML MDRD: 78 ML/MIN/1.73 (ref 51–120)
GLOBULIN UR ELPH-MCNC: 3 GM/DL (ref 2.3–3.5)
GLUCOSE BLD-MCNC: 104 MG/DL (ref 74–99)
HCT VFR BLD AUTO: 41 % (ref 35–45)
HGB BLD-MCNC: 13.8 G/DL (ref 12–15.5)
KOH PREP NAIL: NORMAL
LYMPHOCYTES # BLD AUTO: 1.97 10*3/MM3 (ref 0.6–4.2)
LYMPHOCYTES NFR BLD AUTO: 26.4 % (ref 10–50)
MCH RBC QN AUTO: 29.2 PG (ref 26.5–34)
MCHC RBC AUTO-ENTMCNC: 33.7 G/DL (ref 31.4–36)
MCV RBC AUTO: 86.7 FL (ref 80–98)
MONOCYTES # BLD AUTO: 0.54 10*3/MM3 (ref 0–0.9)
MONOCYTES NFR BLD AUTO: 7.2 % (ref 0–12)
NEUTROPHILS # BLD AUTO: 4.79 10*3/MM3 (ref 2–8.6)
NEUTROPHILS NFR BLD AUTO: 64.3 % (ref 37–80)
PLATELET # BLD AUTO: 268 10*3/MM3 (ref 150–450)
PMV BLD AUTO: 8.7 FL (ref 8–12)
POTASSIUM BLD-SCNC: 4.4 MMOL/L (ref 3.4–5)
PROT SERPL-MCNC: 7.8 G/DL (ref 6.3–8.2)
RBC # BLD AUTO: 4.73 10*6/MM3 (ref 3.77–5.16)
RHEUMATOID FACT SERPL-ACNC: POSITIVE [IU]/ML
RHEUMATOID FACT TITR SER: NORMAL IU/ML
SODIUM BLD-SCNC: 140 MMOL/L (ref 137–145)
URATE SERPL-MCNC: 4.7 MG/DL (ref 2.5–6.2)
WBC NRBC COR # BLD: 7.46 10*3/MM3 (ref 3.2–9.8)

## 2019-03-19 PROCEDURE — 86140 C-REACTIVE PROTEIN: CPT | Performed by: NURSE PRACTITIONER

## 2019-03-19 PROCEDURE — 84550 ASSAY OF BLOOD/URIC ACID: CPT | Performed by: NURSE PRACTITIONER

## 2019-03-19 PROCEDURE — 86431 RHEUMATOID FACTOR QUANT: CPT | Performed by: NURSE PRACTITIONER

## 2019-03-19 PROCEDURE — 87102 FUNGUS ISOLATION CULTURE: CPT | Performed by: NURSE PRACTITIONER

## 2019-03-19 PROCEDURE — 86038 ANTINUCLEAR ANTIBODIES: CPT | Performed by: NURSE PRACTITIONER

## 2019-03-19 PROCEDURE — 86430 RHEUMATOID FACTOR TEST QUAL: CPT | Performed by: NURSE PRACTITIONER

## 2019-03-19 PROCEDURE — 87220 TISSUE EXAM FOR FUNGI: CPT | Performed by: NURSE PRACTITIONER

## 2019-03-19 PROCEDURE — 36415 COLL VENOUS BLD VENIPUNCTURE: CPT | Performed by: NURSE PRACTITIONER

## 2019-03-19 PROCEDURE — 80053 COMPREHEN METABOLIC PANEL: CPT | Performed by: NURSE PRACTITIONER

## 2019-03-19 PROCEDURE — 85025 COMPLETE CBC W/AUTO DIFF WBC: CPT | Performed by: NURSE PRACTITIONER

## 2019-03-19 PROCEDURE — 86225 DNA ANTIBODY NATIVE: CPT

## 2019-03-19 PROCEDURE — 99214 OFFICE O/P EST MOD 30 MIN: CPT | Performed by: NURSE PRACTITIONER

## 2019-03-19 RX ORDER — GABAPENTIN 100 MG/1
100 CAPSULE ORAL 2 TIMES DAILY
Qty: 60 CAPSULE | Refills: 0 | Status: SHIPPED | OUTPATIENT
Start: 2019-03-19 | End: 2019-10-02

## 2019-03-19 RX ORDER — SULFAMETHOXAZOLE AND TRIMETHOPRIM 800; 160 MG/1; MG/1
1 TABLET ORAL 2 TIMES DAILY
Qty: 14 TABLET | Refills: 0 | Status: SHIPPED | OUTPATIENT
Start: 2019-03-19 | End: 2019-03-26

## 2019-03-21 LAB
ANA SER QL: POSITIVE
DSDNA AB SER-ACNC: 1 IU/ML (ref 0–9)
Lab: NORMAL

## 2019-03-26 ENCOUNTER — OFFICE VISIT (OUTPATIENT)
Dept: FAMILY MEDICINE CLINIC | Facility: CLINIC | Age: 54
End: 2019-03-26

## 2019-03-26 VITALS
BODY MASS INDEX: 22.06 KG/M2 | HEART RATE: 82 BPM | WEIGHT: 129.2 LBS | TEMPERATURE: 98 F | HEIGHT: 64 IN | SYSTOLIC BLOOD PRESSURE: 118 MMHG | OXYGEN SATURATION: 99 % | DIASTOLIC BLOOD PRESSURE: 64 MMHG

## 2019-03-26 DIAGNOSIS — I73.00 RAYNAUD'S DISEASE WITHOUT GANGRENE: ICD-10-CM

## 2019-03-26 DIAGNOSIS — R76.8 ANA POSITIVE: ICD-10-CM

## 2019-03-26 DIAGNOSIS — R76.8 RHEUMATOID FACTOR POSITIVE: Primary | ICD-10-CM

## 2019-03-26 DIAGNOSIS — M25.50 ARTHRALGIA, UNSPECIFIED JOINT: ICD-10-CM

## 2019-03-26 DIAGNOSIS — L60.9 NAIL ABNORMALITIES: ICD-10-CM

## 2019-03-26 PROCEDURE — 99213 OFFICE O/P EST LOW 20 MIN: CPT | Performed by: NURSE PRACTITIONER

## 2019-04-11 ENCOUNTER — OFFICE VISIT (OUTPATIENT)
Dept: WOUND CARE | Facility: HOSPITAL | Age: 54
End: 2019-04-11

## 2019-04-11 ENCOUNTER — LAB REQUISITION (OUTPATIENT)
Dept: LAB | Facility: HOSPITAL | Age: 54
End: 2019-04-11

## 2019-04-11 DIAGNOSIS — I96 GANGRENE, NOT ELSEWHERE CLASSIFIED (HCC): ICD-10-CM

## 2019-04-11 PROCEDURE — 87147 CULTURE TYPE IMMUNOLOGIC: CPT | Performed by: NURSE PRACTITIONER

## 2019-04-11 PROCEDURE — 87070 CULTURE OTHR SPECIMN AEROBIC: CPT | Performed by: NURSE PRACTITIONER

## 2019-04-11 PROCEDURE — G0463 HOSPITAL OUTPT CLINIC VISIT: HCPCS

## 2019-04-11 PROCEDURE — 87205 SMEAR GRAM STAIN: CPT | Performed by: NURSE PRACTITIONER

## 2019-04-11 PROCEDURE — 87186 SC STD MICRODIL/AGAR DIL: CPT | Performed by: NURSE PRACTITIONER

## 2019-04-14 LAB
BACTERIA SPEC AEROBE CULT: ABNORMAL
BACTERIA SPEC AEROBE CULT: ABNORMAL
GRAM STN SPEC: ABNORMAL
GRAM STN SPEC: ABNORMAL

## 2019-04-18 ENCOUNTER — OFFICE VISIT (OUTPATIENT)
Dept: WOUND CARE | Facility: HOSPITAL | Age: 54
End: 2019-04-18

## 2019-04-18 PROCEDURE — G0463 HOSPITAL OUTPT CLINIC VISIT: HCPCS

## 2019-04-22 LAB — FUNGUS WND CULT: ABNORMAL

## 2019-04-25 ENCOUNTER — OFFICE VISIT (OUTPATIENT)
Dept: WOUND CARE | Facility: HOSPITAL | Age: 54
End: 2019-04-25

## 2019-05-02 ENCOUNTER — OFFICE VISIT (OUTPATIENT)
Dept: WOUND CARE | Facility: HOSPITAL | Age: 54
End: 2019-05-02

## 2019-05-02 ENCOUNTER — TRANSCRIBE ORDERS (OUTPATIENT)
Dept: ADMINISTRATIVE | Facility: HOSPITAL | Age: 54
End: 2019-05-02

## 2019-05-02 DIAGNOSIS — I73.01 RAYNAUD'S SYNDROME WITH GANGRENE (HCC): Primary | ICD-10-CM

## 2019-05-02 PROCEDURE — G0463 HOSPITAL OUTPT CLINIC VISIT: HCPCS

## 2019-05-09 ENCOUNTER — OFFICE VISIT (OUTPATIENT)
Dept: WOUND CARE | Facility: HOSPITAL | Age: 54
End: 2019-05-09

## 2019-05-09 PROCEDURE — G0463 HOSPITAL OUTPT CLINIC VISIT: HCPCS

## 2019-05-16 ENCOUNTER — OFFICE VISIT (OUTPATIENT)
Dept: WOUND CARE | Facility: HOSPITAL | Age: 54
End: 2019-05-16

## 2019-05-30 ENCOUNTER — OFFICE VISIT (OUTPATIENT)
Dept: WOUND CARE | Facility: HOSPITAL | Age: 54
End: 2019-05-30

## 2019-05-30 PROCEDURE — G0463 HOSPITAL OUTPT CLINIC VISIT: HCPCS

## 2019-10-02 ENCOUNTER — OFFICE VISIT (OUTPATIENT)
Dept: FAMILY MEDICINE CLINIC | Facility: CLINIC | Age: 54
End: 2019-10-02

## 2019-10-02 VITALS
HEIGHT: 64 IN | WEIGHT: 133 LBS | BODY MASS INDEX: 22.71 KG/M2 | HEART RATE: 72 BPM | DIASTOLIC BLOOD PRESSURE: 60 MMHG | SYSTOLIC BLOOD PRESSURE: 118 MMHG | OXYGEN SATURATION: 98 %

## 2019-10-02 DIAGNOSIS — I73.00 RAYNAUD'S DISEASE WITHOUT GANGRENE: ICD-10-CM

## 2019-10-02 DIAGNOSIS — Z12.31 ENCOUNTER FOR SCREENING MAMMOGRAM FOR BREAST CANCER: ICD-10-CM

## 2019-10-02 DIAGNOSIS — Z00.00 ANNUAL PHYSICAL EXAM: Primary | ICD-10-CM

## 2019-10-02 PROCEDURE — 99396 PREV VISIT EST AGE 40-64: CPT | Performed by: FAMILY MEDICINE

## 2019-10-02 RX ORDER — NIFEDIPINE 30 MG/1
TABLET, EXTENDED RELEASE ORAL
Qty: 45 TABLET | Refills: 3 | Status: SHIPPED | OUTPATIENT
Start: 2019-10-02

## 2019-10-02 RX ORDER — NIFEDIPINE 30 MG/1
30 TABLET, EXTENDED RELEASE ORAL DAILY
COMMUNITY
End: 2019-10-02 | Stop reason: SDUPTHER

## 2019-10-02 RX ORDER — SODIUM PHOSPHATE,MONO-DIBASIC 19G-7G/118
ENEMA (ML) RECTAL 2 TIMES DAILY
COMMUNITY

## 2019-10-02 RX ORDER — MULTIPLE VITAMINS W/ MINERALS TAB 9MG-400MCG
1 TAB ORAL DAILY
COMMUNITY

## 2019-10-02 NOTE — PROGRESS NOTES
"S: Anum Lawson is a 55 y/o F presenting for annual check-up with a MHx remarkable for Raynaud's phenomenon (L hand) and gangrene infection of L hand. She has no acute concerns or complaints today. She has been feeling well since her last visit with no new surgeries, hospitalizations, or family illness. 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 She does complain of generalized aches and pains she associates with work that are worse in the morning and that ease as she moves about before work. She also complains of pain in her wrists and fingers that comes and goes when she wakes up some mornings that goes away with movement. She denies headaches, acute vision changes, rashes, chest pain, SOB, n/v, diarrhea, or gait disturbances.    Her Raynaud's continues to cause her some discomfort and pain at work due to cold and finger manipulation that she describes as arthritis-like, but does not feel it has gotten worse since the gangrenous infection in March-May of this year (2019). It continues to bother her L ring and middle fingers near the tip. She is currently taking her nifedipine for Raynaud's at 15mg per day when it \"feels bad\" but not otherwise because it gives her dizzyness.     Her diet is varied and she reports no issues with appetite or eating. She drinks 4-5 cups of coffee a day. She does not smoke or drink or use any other substances. She wants to see an optho for new glasses, and feels her hearing has declined slightly in her L ear but not to the point of intervention. She has been menopausal since 2015 (LMP) and is sexually active with her  with no form of contraception given her menopausal state. She denies any hot flashes, mood swings, or sexual dysfunction.     O: /60 / HR 72 / W 133 lb / H 64\" / BMI 22.8  General: Appears alert and answers questions appropriately  HEENT: TMs are patent and non-erythematous bilaterally, oral mucosa non-erythematous " with no lesions, tooth 18 (2nd to last molar on bottom L) is missing, nasal mucosa is non-erythematous with no polyps, neck is non-tender, no nodules or LAD palpated  CV: RRR no murmurs or rubs, cap refill <2s in R hand, cap refill >2s in L hand, L ring and middle fingers very cold from PIP and distal, L index and fifth finger cool to touch from DIP and distal  Pulm: Clear to auscultation bilaterally, no wheezes, normal breath sounds and work of breathing  Abdominal: Normal bowel sounds, no tenderness to palpation  MSK: Positive phalen's sign in both wrists and positive tinel's sign in L wrist, normal ROM of all extremities  Neuro: Reflexes 3+, normal strength in lower extremities, reduced  strength in both hands    A/P: Anum Lawson is a 55 y/o F presenting for annual check with no acute concerns with a MHx remarkable for Raynaud's of L hand and gangrene/MSSA infection.    1. Raynaud's: Currently patient doesn't feel it's getting worse, despite feeling cool L index and fifth finger as compared to R counterparts when previously it was only associated with the L ring and middle fingers. Pain and discomfort is exacerbated by working in the meat department as the freezer and generally cold temperature makes it worse. Currently happy taking half nifedipine dose at 15 mg for discomfort and pain, is concerned about feeling dizzy at full dose of 30mg and sometimes even at 15mg. Patient was advised that taking at half dose is fine, and can even take quarter dose at 7.5mg if this would continue to reduce discomfort without causing dizzyness. She was also advised that finding a dose that she can take every day, or at least every other day, without side effects could help reduce how often she is having associated pain. She follows Dr. Flowers for Raynau'd secondary to CREST syndrome.     2. 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 Pain seems to go away with stretching in the morning, is better throughout the day. No weakness was detected, unlikely to be more than strains and aches from work. Currently patient feels fine taking occasional tylenol for it. Needs no intervention today.    3. Preventative Care: Patient is getting her flu vaccine at [facility] as this is cheaper for her than in clinic today, she will get us appropriate paperwork when she does. She denied the shingles vaccine but was counseled on what shingles is and why we vaccinate for it. She is open to looking into the shingles vaccine in the future. We are referring her for mammogram as she is due - her last mammogram was in 2018.   I have seen and examined the patient with the medical student.  I agree with the documentation above.      This document has been electronically signed by Patricia Nelson MD on 2019 7:29 PM    Subjective   Chief Complaint   Patient presents with   • [facility]       Anum Lawson is a 54 y.o. female who presents for Establish Care       History of Present Illness  Patient presents for her annual exam.  She is reestablishing care.  Will get her influenza vaccine at work.  She will also get her Shingrix at work.  She is due for her yearly mammogram.    The following portions of the patient's history were reviewed and updated as appropriate:    Past Medical History:   Diagnosis Date   • Arthritis    • History of mammogram 2014   • Joint pain    • Painful swelling of joint    • Raynaud's disease        Past Surgical History:   Procedure Laterality Date   •  SECTION     • COLONOSCOPY  2014    Normal   • PAP SMEAR  2013    (2)    • TUBAL ABDOMINAL LIGATION     • US CAROTID UNILATERAL  2012       Family History   Problem Relation Age of Onset   • Cancer Other         grandmother   • Heart disease Other         grandparents   • Alzheimer's disease Other    • Colon cancer Other    • Lung cancer  Other    • Thyroid disease Other    • Schizophrenia Mother    • Schizophrenia Brother    • Colon cancer Paternal Grandmother        Social History     Socioeconomic History   • Marital status:      Spouse name: Not on file   • Number of children: Not on file   • Years of education: Not on file   • Highest education level: Not on file   Tobacco Use   • Smoking status: Former Smoker   • Smokeless tobacco: Never Used   • Tobacco comment: household exposure   Substance and Sexual Activity   • Alcohol use: No   • Drug use: No   • Sexual activity: Yes       Medications:  Outpatient Medications Prior to Visit   Medication Sig Dispense Refill   • glucosamine-chondroitin 500-400 MG capsule capsule Take  by mouth 2 (Two) Times a Day.     • Multiple Vitamins-Minerals (MULTIVITAMIN WITH MINERALS) tablet tablet Take 1 tablet by mouth Daily.     • NIFEdipine XL (PROCARDIA XL) 30 MG 24 hr tablet Take 30 mg by mouth Daily.     • cephalexin (KEFLEX) 500 MG capsule Take 1 capsule by mouth 2 (Two) Times a Day. 20 capsule 0   • ciclopirox (PENLAC) 8 % solution Apply  topically to the appropriate area as directed Every Night. 6 mL 0   • fluconazole (DIFLUCAN) 200 MG tablet Take 1 tablet by mouth 1 (One) Time Per Week. 8 tablet 0   • gabapentin (NEURONTIN) 100 MG capsule Take 1 capsule by mouth 2 (Two) Times a Day. 60 capsule 0     No facility-administered medications prior to visit.        No Known Allergies    Review of Systems   Constitutional: Negative for activity change, appetite change, fatigue and fever.   HENT: Negative for ear pain and sore throat.    Eyes: Negative for pain and visual disturbance.   Respiratory: Negative for cough and shortness of breath.    Cardiovascular: Negative for chest pain and palpitations.   Gastrointestinal: Negative for abdominal pain and nausea.   Endocrine: Positive for cold intolerance. Negative for heat intolerance.   Genitourinary: Negative for difficulty urinating and dysuria.  "  Musculoskeletal: Negative for arthralgias and gait problem.   Skin: Negative for color change and rash.   Neurological: Negative for dizziness, weakness and headaches.   Hematological: Negative for adenopathy. Does not bruise/bleed easily.   Psychiatric/Behavioral: Negative for agitation, confusion and sleep disturbance.       Objective   Visit Vitals  /60 (BP Location: Left arm, Patient Position: Sitting, Cuff Size: Adult)   Pulse 72   Ht 162.6 cm (64\")   Wt 60.3 kg (133 lb)   SpO2 98%   BMI 22.83 kg/m²       Physical Exam   Constitutional: She is oriented to person, place, and time. She appears well-developed and well-nourished.   HENT:   Head: Normocephalic and atraumatic.   Right Ear: Hearing, tympanic membrane, external ear and ear canal normal.   Left Ear: Hearing, tympanic membrane, external ear and ear canal normal.   Nose: Nose normal.   Mouth/Throat: Uvula is midline, oropharynx is clear and moist and mucous membranes are normal.   Eyes: Conjunctivae and EOM are normal. Pupils are equal, round, and reactive to light.   Neck: Normal range of motion. Neck supple.   Cardiovascular: Normal rate, regular rhythm, normal heart sounds and intact distal pulses.   Pulmonary/Chest: Effort normal and breath sounds normal.   Abdominal: Soft. Bowel sounds are normal. She exhibits no distension. There is no tenderness.   Musculoskeletal: Normal range of motion.   Neurological: She is alert and oriented to person, place, and time.   Skin: Skin is warm and dry.   Psychiatric: She has a normal mood and affect. Her speech is normal and behavior is normal. Judgment and thought content normal. Cognition and memory are normal.       PHQ-2/PHQ-9 Depression Screening 10/2/2019   Little interest or pleasure in doing things 0   Feeling down, depressed, or hopeless 0   Trouble falling or staying asleep, or sleeping too much 0   Feeling tired or having little energy 0   Poor appetite or overeating 0   Feeling bad about " yourself - or that you are a failure or have let yourself or your family down 0   Trouble concentrating on things, such as reading the newspaper or watching television 0   Moving or speaking so slowly that other people could have noticed. Or the opposite - being so fidgety or restless that you have been moving around a lot more than usual 0   Thoughts that you would be better off dead, or of hurting yourself in some way 0   Total Score 0   If you checked off any problems, how difficult have these problems made it for you to do your work, take care of things at home, or get along with other people? Not difficult at all     Anum was seen today for establish care.    Diagnoses and all orders for this visit:    Annual physical exam    Encounter for screening mammogram for breast cancer  -     Mammo Screening Digital Tomosynthesis Bilateral With CAD    Raynaud's disease without gangrene    Other orders  -     NIFEdipine XL (PROCARDIA XL) 30 MG 24 hr tablet; Take 1/2 tab as needed for hand pain        Follow up: Return in about 1 year (around 10/2/2020) for Annual.    Goals        Exercise    • Exercise 150 minutes per week (moderate activity)      Barriers: None              Preventative:  Female Preventative: Mammogram is due  Vaccines:  Tetanus vaccine:  up to date  Annual influenza vaccine:  Recommended  Zoster vaccine:  Recommended     Patient is non smoker.   does not drink  Patient's Body mass index is 22.83 kg/m². BMI is within normal parameters. No follow-up required..    RISK SCORE: 3       This document has been electronically signed by Patricia Nelson MD on October 11, 2019 7:31 PM

## 2019-11-13 ENCOUNTER — OFFICE VISIT (OUTPATIENT)
Dept: FAMILY MEDICINE CLINIC | Facility: CLINIC | Age: 54
End: 2019-11-13

## 2019-11-13 VITALS
BODY MASS INDEX: 22.2 KG/M2 | TEMPERATURE: 97.9 F | WEIGHT: 130 LBS | DIASTOLIC BLOOD PRESSURE: 72 MMHG | SYSTOLIC BLOOD PRESSURE: 118 MMHG | HEART RATE: 82 BPM | OXYGEN SATURATION: 98 % | HEIGHT: 64 IN

## 2019-11-13 DIAGNOSIS — J40 BRONCHITIS: Primary | ICD-10-CM

## 2019-11-13 PROCEDURE — 99213 OFFICE O/P EST LOW 20 MIN: CPT | Performed by: NURSE PRACTITIONER

## 2019-11-13 RX ORDER — BROMPHENIRAMINE MALEATE, PSEUDOEPHEDRINE HYDROCHLORIDE, AND DEXTROMETHORPHAN HYDROBROMIDE 2; 30; 10 MG/5ML; MG/5ML; MG/5ML
5 SYRUP ORAL 4 TIMES DAILY PRN
Qty: 118 ML | Refills: 0 | Status: SHIPPED | OUTPATIENT
Start: 2019-11-13

## 2019-11-13 RX ORDER — ALBUTEROL SULFATE 90 UG/1
2 AEROSOL, METERED RESPIRATORY (INHALATION) EVERY 4 HOURS PRN
Qty: 1 INHALER | Refills: 0 | Status: SHIPPED | OUTPATIENT
Start: 2019-11-13

## 2019-11-13 RX ORDER — PREDNISONE 10 MG/1
10 TABLET ORAL 2 TIMES DAILY
Qty: 10 TABLET | Refills: 0 | Status: SHIPPED | OUTPATIENT
Start: 2019-11-13 | End: 2019-11-18

## 2019-11-13 RX ORDER — AZITHROMYCIN 250 MG/1
TABLET, FILM COATED ORAL
Qty: 6 TABLET | Refills: 0 | Status: SHIPPED | OUTPATIENT
Start: 2019-11-13

## 2019-11-13 NOTE — PROGRESS NOTES
"Subjective   Anum Lawson is a 54 y.o. female. Patient here today with complaints of Cough  pt here today with complaints of cough, chest and head congestion. Reports hx of pneumonia x 2 in the past, denies tobacco use. Does complain of sore throat, chills, headache, weakness, sneezing, rhinorrhea and \"deep cough\" which is productive. Reports symptoms present x 2 days.     Vitals:    11/13/19 1108   BP: 118/72   Pulse: 82   Temp: 97.9 °F (36.6 °C)   TempSrc: Tympanic   SpO2: 98%   Weight: 59 kg (130 lb)   Height: 162.6 cm (64\")     Body mass index is 22.31 kg/m².  Past Medical History:   Diagnosis Date   • Arthritis    • History of mammogram 12/05/2014   • Joint pain    • Painful swelling of joint    • Raynaud's disease      Cough   This is a new problem. The current episode started in the past 7 days. The problem has been gradually worsening. The problem occurs every few minutes. The cough is productive of sputum. Associated symptoms include chills, ear congestion, headaches, nasal congestion, postnasal drip, rhinorrhea and a sore throat. Pertinent negatives include no chest pain, fever, shortness of breath or sweats. Treatments tried: nyquil and nasal spray  The treatment provided no relief. Her past medical history is significant for pneumonia.        The following portions of the patient's history were reviewed and updated as appropriate: allergies, current medications, past family history, past medical history, past social history, past surgical history and problem list.    Review of Systems   Constitutional: Positive for chills. Negative for fever.   HENT: Positive for postnasal drip, rhinorrhea and sore throat.    Eyes: Negative.    Respiratory: Positive for cough. Negative for shortness of breath.    Cardiovascular: Negative.  Negative for chest pain.   Gastrointestinal: Negative.    Endocrine: Negative.    Genitourinary: Negative.    Musculoskeletal: Negative.    Skin: Negative.  "   Allergic/Immunologic: Negative.    Neurological: Positive for headaches.   Hematological: Negative.    Psychiatric/Behavioral: Negative.        Objective   Physical Exam   Constitutional: She is oriented to person, place, and time. She appears well-developed and well-nourished. No distress.   HENT:   Head: Normocephalic and atraumatic.   Right Ear: Hearing, tympanic membrane, external ear and ear canal normal.   Left Ear: Hearing, tympanic membrane, external ear and ear canal normal.   Nose: Rhinorrhea present. Right sinus exhibits no maxillary sinus tenderness and no frontal sinus tenderness. Left sinus exhibits no maxillary sinus tenderness and no frontal sinus tenderness.   Mouth/Throat: Uvula is midline and mucous membranes are normal. Posterior oropharyngeal erythema (with mild cobblestoning appearance noted) present. No tonsillar exudate.   Neck: Neck supple.   Cardiovascular: Normal rate, regular rhythm and normal heart sounds. Exam reveals no gallop and no friction rub.   No murmur heard.  Pulmonary/Chest: Effort normal. No stridor. No respiratory distress. She has wheezes in the right upper field, the right middle field, the right lower field, the left upper field, the left middle field and the left lower field. She has no rales.   Exp wheezes ausc throughout, pulse ox on RA 98%   Lymphadenopathy:     She has cervical adenopathy.   Neurological: She is alert and oriented to person, place, and time.   Skin: Skin is warm and dry. No rash noted. She is not diaphoretic. No erythema. No pallor.   Psychiatric: She has a normal mood and affect. Her behavior is normal.   Nursing note and vitals reviewed.      Assessment/Plan   Anum was seen today for cough.    Diagnoses and all orders for this visit:    Bronchitis    Other orders  -     albuterol sulfate  (90 Base) MCG/ACT inhaler; Inhale 2 puffs Every 4 (Four) Hours As Needed for Wheezing.  -     predniSONE (DELTASONE) 10 MG tablet; Take 1 tablet by  mouth 2 (Two) Times a Day for 5 days.  -     azithromycin (ZITHROMAX Z-ANI) 250 MG tablet; Take 2 tablets the first day, then 1 tablet daily for 4 days.  -     brompheniramine-pseudoephedrine-DM 30-2-10 MG/5ML syrup; Take 5 mL by mouth 4 (Four) Times a Day As Needed for Congestion or Cough.    she is treated with albuterol inhaler, bromphed prn symptom relief, prednisone bid po as above and zithromax pk  She states she has not done well with steroid injections in the past  Does not need work excuse today  If symptoms should persist or worsen she is to RTC for recheck  Otherwise, will see her back as scheduled for chronic conditions  She is aware and is in agreement to this plan  All questions and concerns are addressed with understanding noted.

## 2019-12-02 DIAGNOSIS — Z12.11 ENCOUNTER FOR SCREENING COLONOSCOPY: ICD-10-CM

## 2019-12-02 DIAGNOSIS — Z86.010 HISTORY OF COLON POLYPS: Primary | ICD-10-CM

## 2023-08-02 ENCOUNTER — TRANSCRIBE ORDERS (OUTPATIENT)
Dept: WOUND CARE | Facility: HOSPITAL | Age: 58
End: 2023-08-02
Payer: COMMERCIAL

## 2023-08-02 DIAGNOSIS — I73.01 RAYNAUD'S SYNDROME WITH GANGRENE: Primary | ICD-10-CM

## 2023-08-08 ENCOUNTER — OUTSIDE FACILITY SERVICE (OUTPATIENT)
Dept: WOUND CARE | Facility: HOSPITAL | Age: 58
End: 2023-08-08
Payer: COMMERCIAL

## 2023-08-08 ENCOUNTER — OFFICE VISIT (OUTPATIENT)
Dept: WOUND CARE | Facility: HOSPITAL | Age: 58
End: 2023-08-08
Payer: COMMERCIAL

## 2023-08-08 PROCEDURE — G0463 HOSPITAL OUTPT CLINIC VISIT: HCPCS

## 2023-08-15 ENCOUNTER — LAB (OUTPATIENT)
Dept: LAB | Facility: HOSPITAL | Age: 58
End: 2023-08-15
Payer: COMMERCIAL

## 2023-08-15 ENCOUNTER — OFFICE VISIT (OUTPATIENT)
Dept: WOUND CARE | Facility: HOSPITAL | Age: 58
End: 2023-08-15
Payer: COMMERCIAL

## 2023-08-15 ENCOUNTER — HOSPITAL ENCOUNTER (OUTPATIENT)
Dept: GENERAL RADIOLOGY | Facility: HOSPITAL | Age: 58
Discharge: HOME | End: 2023-08-15
Payer: COMMERCIAL

## 2023-08-15 ENCOUNTER — OUTSIDE FACILITY SERVICE (OUTPATIENT)
Dept: WOUND CARE | Facility: HOSPITAL | Age: 58
End: 2023-08-15
Payer: COMMERCIAL

## 2023-08-15 DIAGNOSIS — L03.90 CELLULITIS, UNSPECIFIED CELLULITIS SITE: ICD-10-CM

## 2023-08-15 DIAGNOSIS — M79.642 HAND PAIN, LEFT: Primary | ICD-10-CM

## 2023-08-15 DIAGNOSIS — M79.642 HAND PAIN, LEFT: ICD-10-CM

## 2023-08-15 LAB
ALBUMIN SERPL-MCNC: 4.2 G/DL (ref 3.5–5.2)
ALBUMIN/GLOB SERPL: 2 G/DL
ALP SERPL-CCNC: 61 U/L (ref 39–117)
ALT SERPL W P-5'-P-CCNC: 17 U/L (ref 1–33)
ANION GAP SERPL CALCULATED.3IONS-SCNC: 13.4 MMOL/L (ref 5–15)
AST SERPL-CCNC: 28 U/L (ref 1–32)
BASOPHILS # BLD AUTO: 0.06 10*3/MM3 (ref 0–0.2)
BASOPHILS NFR BLD AUTO: 0.8 % (ref 0–1.5)
BILIRUB SERPL-MCNC: 0.3 MG/DL (ref 0–1.2)
BUN SERPL-MCNC: 8 MG/DL (ref 6–20)
BUN/CREAT SERPL: 14.5 (ref 7–25)
CALCIUM SPEC-SCNC: 9.2 MG/DL (ref 8.6–10.5)
CHLORIDE SERPL-SCNC: 101 MMOL/L (ref 98–107)
CO2 SERPL-SCNC: 24.6 MMOL/L (ref 22–29)
CREAT SERPL-MCNC: 0.55 MG/DL (ref 0.57–1)
CRP SERPL-MCNC: 0.41 MG/DL (ref 0–0.5)
DEPRECATED RDW RBC AUTO: 39.5 FL (ref 37–54)
EGFRCR SERPLBLD CKD-EPI 2021: 105.7 ML/MIN/1.73
EOSINOPHIL # BLD AUTO: 0.1 10*3/MM3 (ref 0–0.4)
EOSINOPHIL NFR BLD AUTO: 1.3 % (ref 0.3–6.2)
ERYTHROCYTE [DISTWIDTH] IN BLOOD BY AUTOMATED COUNT: 12.8 % (ref 12.3–15.4)
ERYTHROCYTE [SEDIMENTATION RATE] IN BLOOD: 5 MM/HR (ref 0–30)
GLOBULIN UR ELPH-MCNC: 2.1 GM/DL
GLUCOSE SERPL-MCNC: 83 MG/DL (ref 65–99)
HCT VFR BLD AUTO: 38.9 % (ref 34–46.6)
HGB BLD-MCNC: 13.4 G/DL (ref 12–15.9)
IMM GRANULOCYTES # BLD AUTO: 0.03 10*3/MM3 (ref 0–0.05)
IMM GRANULOCYTES NFR BLD AUTO: 0.4 % (ref 0–0.5)
LYMPHOCYTES # BLD AUTO: 1.8 10*3/MM3 (ref 0.7–3.1)
LYMPHOCYTES NFR BLD AUTO: 23.7 % (ref 19.6–45.3)
MCH RBC QN AUTO: 29.4 PG (ref 26.6–33)
MCHC RBC AUTO-ENTMCNC: 34.4 G/DL (ref 31.5–35.7)
MCV RBC AUTO: 85.3 FL (ref 79–97)
MONOCYTES # BLD AUTO: 0.66 10*3/MM3 (ref 0.1–0.9)
MONOCYTES NFR BLD AUTO: 8.7 % (ref 5–12)
NEUTROPHILS NFR BLD AUTO: 4.94 10*3/MM3 (ref 1.7–7)
NEUTROPHILS NFR BLD AUTO: 65.1 % (ref 42.7–76)
NRBC BLD AUTO-RTO: 0 /100 WBC (ref 0–0.2)
PLATELET # BLD AUTO: 246 10*3/MM3 (ref 140–450)
PMV BLD AUTO: 9.8 FL (ref 6–12)
POTASSIUM SERPL-SCNC: 4.4 MMOL/L (ref 3.5–5.2)
PROT SERPL-MCNC: 6.3 G/DL (ref 6–8.5)
RBC # BLD AUTO: 4.56 10*6/MM3 (ref 3.77–5.28)
SODIUM SERPL-SCNC: 139 MMOL/L (ref 136–145)
WBC NRBC COR # BLD: 7.59 10*3/MM3 (ref 3.4–10.8)

## 2023-08-15 PROCEDURE — 86140 C-REACTIVE PROTEIN: CPT

## 2023-08-15 PROCEDURE — 85025 COMPLETE CBC W/AUTO DIFF WBC: CPT

## 2023-08-15 PROCEDURE — G0463 HOSPITAL OUTPT CLINIC VISIT: HCPCS

## 2023-08-15 PROCEDURE — 80053 COMPREHEN METABOLIC PANEL: CPT

## 2023-08-15 PROCEDURE — 85652 RBC SED RATE AUTOMATED: CPT

## 2023-08-15 PROCEDURE — 36415 COLL VENOUS BLD VENIPUNCTURE: CPT

## 2023-08-15 PROCEDURE — 73130 X-RAY EXAM OF HAND: CPT

## 2023-08-22 ENCOUNTER — OFFICE VISIT (OUTPATIENT)
Dept: WOUND CARE | Facility: HOSPITAL | Age: 58
End: 2023-08-22
Payer: COMMERCIAL

## 2023-08-22 ENCOUNTER — OUTSIDE FACILITY SERVICE (OUTPATIENT)
Dept: WOUND CARE | Facility: HOSPITAL | Age: 58
End: 2023-08-22
Payer: COMMERCIAL

## 2023-08-29 ENCOUNTER — OFFICE VISIT (OUTPATIENT)
Dept: WOUND CARE | Facility: HOSPITAL | Age: 58
End: 2023-08-29
Payer: COMMERCIAL

## 2023-08-29 ENCOUNTER — OUTSIDE FACILITY SERVICE (OUTPATIENT)
Dept: WOUND CARE | Facility: HOSPITAL | Age: 58
End: 2023-08-29
Payer: COMMERCIAL

## 2023-08-29 PROCEDURE — G0463 HOSPITAL OUTPT CLINIC VISIT: HCPCS

## 2023-09-05 ENCOUNTER — OFFICE VISIT (OUTPATIENT)
Dept: WOUND CARE | Facility: HOSPITAL | Age: 58
End: 2023-09-05
Payer: COMMERCIAL

## 2023-09-05 ENCOUNTER — OUTSIDE FACILITY SERVICE (OUTPATIENT)
Dept: WOUND CARE | Facility: HOSPITAL | Age: 58
End: 2023-09-05
Payer: COMMERCIAL

## 2023-09-05 PROCEDURE — G0463 HOSPITAL OUTPT CLINIC VISIT: HCPCS

## 2023-09-12 ENCOUNTER — OUTSIDE FACILITY SERVICE (OUTPATIENT)
Dept: WOUND CARE | Facility: HOSPITAL | Age: 58
End: 2023-09-12
Payer: COMMERCIAL

## 2023-09-12 ENCOUNTER — OFFICE VISIT (OUTPATIENT)
Dept: WOUND CARE | Facility: HOSPITAL | Age: 58
End: 2023-09-12
Payer: COMMERCIAL

## 2023-09-12 PROCEDURE — G0463 HOSPITAL OUTPT CLINIC VISIT: HCPCS

## 2023-09-12 PROCEDURE — 99212 OFFICE O/P EST SF 10 MIN: CPT | Performed by: NURSE PRACTITIONER

## 2023-09-19 ENCOUNTER — OUTSIDE FACILITY SERVICE (OUTPATIENT)
Dept: WOUND CARE | Facility: HOSPITAL | Age: 58
End: 2023-09-19
Payer: COMMERCIAL

## 2023-09-19 ENCOUNTER — OFFICE VISIT (OUTPATIENT)
Dept: WOUND CARE | Facility: HOSPITAL | Age: 58
End: 2023-09-19
Payer: COMMERCIAL

## 2023-09-26 ENCOUNTER — OUTSIDE FACILITY SERVICE (OUTPATIENT)
Dept: WOUND CARE | Facility: HOSPITAL | Age: 58
End: 2023-09-26
Payer: COMMERCIAL

## 2023-09-26 ENCOUNTER — OFFICE VISIT (OUTPATIENT)
Dept: WOUND CARE | Facility: HOSPITAL | Age: 58
End: 2023-09-26
Payer: COMMERCIAL

## 2023-09-26 PROCEDURE — 99212 OFFICE O/P EST SF 10 MIN: CPT | Performed by: NURSE PRACTITIONER

## 2023-09-26 PROCEDURE — G0463 HOSPITAL OUTPT CLINIC VISIT: HCPCS
